# Patient Record
Sex: FEMALE | Race: WHITE | Employment: FULL TIME | URBAN - METROPOLITAN AREA
[De-identification: names, ages, dates, MRNs, and addresses within clinical notes are randomized per-mention and may not be internally consistent; named-entity substitution may affect disease eponyms.]

---

## 2017-01-01 ENCOUNTER — TRANSFERRED RECORDS (OUTPATIENT)
Dept: HEALTH INFORMATION MANAGEMENT | Facility: CLINIC | Age: 62
End: 2017-01-01

## 2017-01-01 LAB — PAP SMEAR - HIM PATIENT REPORTED: NEGATIVE

## 2017-09-15 ENCOUNTER — OFFICE VISIT (OUTPATIENT)
Dept: FAMILY MEDICINE | Facility: CLINIC | Age: 62
End: 2017-09-15
Payer: COMMERCIAL

## 2017-09-15 VITALS
HEART RATE: 68 BPM | SYSTOLIC BLOOD PRESSURE: 132 MMHG | OXYGEN SATURATION: 99 % | TEMPERATURE: 97.5 F | BODY MASS INDEX: 24.63 KG/M2 | DIASTOLIC BLOOD PRESSURE: 82 MMHG | WEIGHT: 148 LBS

## 2017-09-15 DIAGNOSIS — Z01.818 PREOP GENERAL PHYSICAL EXAM: Primary | ICD-10-CM

## 2017-09-15 DIAGNOSIS — H26.9 CATARACT OF RIGHT EYE, UNSPECIFIED CATARACT TYPE: ICD-10-CM

## 2017-09-15 PROCEDURE — 99214 OFFICE O/P EST MOD 30 MIN: CPT | Performed by: PHYSICIAN ASSISTANT

## 2017-09-15 NOTE — MR AVS SNAPSHOT
After Visit Summary   9/15/2017    Yanelis Wolff    MRN: [id number]           Patient Information     Date Of Birth          1955        Visit Information        Provider Department      9/15/2017 3:40 PM Mely Golden PA-C [facility]        Today's Diagnoses     Preop general physical exam    -  1    Cataract of right eye, unspecified cataract type          Care Instructions      Before Your Surgery      Call your surgeon if there is any change in your health. This includes signs of a cold or flu (such as a sore throat, runny nose, cough, rash or fever).    Do not smoke, drink alcohol or take over the counter medicine (unless your surgeon or primary care doctor tells you to) for the 24 hours before and after surgery.    If you take prescribed drugs: Follow your doctor s orders about which medicines to take and which to stop until after surgery.    Eating and drinking prior to surgery: follow the instructions from your surgeon    Take a shower or bath the night before surgery. Use the soap your surgeon gave you to gently clean your skin. If you do not have soap from your surgeon, use your regular soap. Do not shave or scrub the surgery site.  Wear clean pajamas and have clean sheets on your bed.           Follow-ups after your visit        Who to contact     If you have questions or need follow up information about today's clinic visit or your schedule please contact [facility] directly at [phone].  Normal or non-critical lab and imaging results will be communicated to you by MyChart, letter or phone within 4 business days after the clinic has received the results. If you do not hear from us within 7 days, please contact the clinic through KeriCurehart or phone. If you have a critical or abnormal lab result, we will notify you by phone as soon as possible.  Submit refill requests through Monitoring [facility] or call your pharmacy and they will forward the refill  "request to us. Please allow 3 business days for your refill to be completed.          Additional Information About Your Visit        DiagnoplexharGT Urological Information     Cityvox lets you send messages to your doctor, view your test results, renew your prescriptions, schedule appointments and more. To sign up, go to [email or web address] . Click on \"Log in\" on the left side of the screen, which will take you to the Welcome page. Then click on \"Sign up Now\" on the right side of the page.     You will be asked to enter the access code listed below, as well as some personal information. Please follow the directions to create your username and password.     Your access code is: [email or web address]  Expires: 2017  3:49 PM     Your access code will  in 90 days. If you need help or a new code, please call your [place] clinic or [phone].        Care EveryWhere ID     This is your [facility] EveryWhere ID. This could be used by other organizations to access your [place] medical records  [id number]        Your Vitals Were     Pulse Temperature Pulse Oximetry BMI (Body Mass Index)          68 97.5  F (36.4  C) (Oral) 99% 24.63 kg/m2         Blood Pressure from Last 3 Encounters:   09/15/17 132/82   13 156/71   09/08/10 110/62    Weight from Last 3 Encounters:   09/15/17 148 lb (67.1 kg)   13 136 lb (61.7 kg)   09/08/10 151 lb (68.5 kg)              Today, you had the following     No orders found for display         Today's Medication Changes          These changes are accurate as of: 9/15/17  3:49 PM.  If you have any questions, ask your nurse or doctor.               Stop taking these medicines if you haven't already. Please contact your care team if you have questions.     acetaminophen 500 MG tablet   Commonly known as:  TYLENOL   Stopped by:  Mely Golden PA-C           ADVIL PO   Stopped by:  Mely Golden PA-C           indomethacin 50 MG capsule   Commonly known as:  INDOCIN   Stopped by:  " Mely Golden PA-C                    Primary Care Provider Office Phone # Fax #    Christy Homa Boucher [phone]       [address]  [place] [address]        Equal Access to Services     PEPEGREGG CODY : Hadii aad ku hadasho Soomaali, waaxda luqadaha, qaybta kaalmada adeegyada, waxay idiin hayaan adeeg khjamisonsh lalaquitan . So [facility] [phone].    ATENCIÓN: Si habla español, tiene a luis disposición servicios gratuitos de asistencia lingüística. Llame al [phone].    We comply with applicable federal civil rights laws and [place] laws. We do not discriminate on the basis of race, color, national origin, age, disability sex, sexual orientation or gender identity.            Thank you!     Thank you for choosing [facility]  [facility] your [facility]. Our goal is always to provide you with excellent care. Hearing back from our patients is one way we can continue to improve our services. Please take a few minutes to complete the written survey that you may receive in the mail after your visit with us. Thank you!             Your Updated Medication List - Protect others around you: Learn how to safely use, store and throw away your medicines at www.disposemymeds.org.      Notice  As of 9/15/2017  3:49 PM    You have not been prescribed any medications.

## 2017-09-15 NOTE — NURSING NOTE
"Chief Complaint   Patient presents with     Pre-Op Exam       Initial /87  Pulse 68  Temp 97.5  F (36.4  C) (Oral)  Wt 148 lb (67.1 kg)  SpO2 99%  BMI 24.63 kg/m2 Estimated body mass index is 24.63 kg/(m^2) as calculated from the following:    Height as of 7/6/13: 5' 5\" (1.651 m).    Weight as of this encounter: 148 lb (67.1 kg).  Medication Reconciliation: complete     Kayla Arthur, ya      "

## 2017-09-15 NOTE — PROGRESS NOTES
[facility]  [address] [address]  [phone]  Dept: [phone]    PRE-OP EVALUATION:  Today's date: 9/15/2017    Yanelis Wolff (: 1955) presents for pre-operative evaluation assessment as requested by Dr. Matos.  She requires evaluation and anesthesia risk assessment prior to undergoing surgery/procedure for treatment of right cataract.  Proposed procedure: cataract repair    Date of Surgery/ Procedure: 17  Time of Surgery/ Procedure: to be determined  Hospital/Surgical Facility: [facility]    Primary Physician: Christy Boucher  Type of Anesthesia Anticipated: to be determined    Patient has a Health Care Directive or Living Will:  NO    Preop Questions 9/15/2017   1.  Do you have a history of heart attack, stroke, stent, bypass or surgery on an artery in the head, neck, heart or legs? No   2.  Do you ever have any pain or discomfort in your chest? No   3.  Do you have a history of  Heart Failure? No   4.   Are you troubled by shortness of breath when:  walking on a level surface, or up a slight hill, or at night? No   5.  Do you currently have a cold, bronchitis or other respiratory infection? No   6.  Do you have a cough, shortness of breath, or wheezing? No    7.  Do you sometimes get pains in the calves of your legs when you walk? No   8. Do you or anyone in your family have previous history of blood clots? No   9.  Do you or does anyone in your family have a serious bleeding problem such as prolonged bleeding following surgeries or cuts? No   10. Have you ever had problems with anemia or been told to take iron pills? No   11. Have you had any abnormal blood loss such as black, tarry or bloody stools, or abnormal vaginal bleeding? No   12. Have you ever had a blood transfusion? No   13. Have you or any of your relatives ever had problems with anesthesia? No   14. Do you have sleep apnea, excessive snoring or daytime drowsiness? No   15. Do  you have any prosthetic heart valves? No   16. Do you have prosthetic joints? No   17. Is there any chance that you may be pregnant? No           HPI:                                                      Brief HPI related to upcoming procedure: problems with right eye vision for the past few months. She is having cataract repair of the right eye only.    Patient continues to decline all health maintenance.     See problem list for active medical problems.  Problems all longstanding and stable, except as noted/documented.  See ROS for pertinent symptoms related to these conditions.                                                                                                  .    MEDICAL HISTORY:                                                    Patient Active Problem List    Diagnosis Date Noted     CARDIOVASCULAR SCREENING; LDL GOAL LESS THAN 160 10/31/2010     Priority: Medium     Microscopic hematuria      Priority: Medium     Edema 09/01/2010     Priority: Medium     Leg pain 09/01/2010     Priority: Medium      Past Medical History:   Diagnosis Date     Edema      Microscopic hematuria      MVP (mitral valve prolapse)     negative stress test and echo in 2009     Smoker     quit in 2009     History reviewed. No pertinent surgical history.  No current outpatient prescriptions on file.     OTC products: None, except as noted above    Allergies   Allergen Reactions     Codeine       Latex Allergy: NO    Social History   Substance Use Topics     Smoking status: Former Smoker     Quit date: 9/1/2009     Smokeless tobacco: Never Used     Alcohol use Yes     History   Drug Use No       REVIEW OF SYSTEMS:                                                    C: NEGATIVE for fever, chills, change in weight  INTEGUMENTARY/SKIN: NEGATIVE for worrisome rashes, moles or lesions  EYES: POSITIVE for right eye - decreased vision and NEGATIVE for left eye vision changes, discharge and eye pain   E/M: NEGATIVE for ear, mouth and  throat problems  R: NEGATIVE for significant cough or SOB  CV: NEGATIVE for chest pain, palpitations or peripheral edema  GI: NEGATIVE for nausea, abdominal pain, heartburn, or change in bowel habits    EXAM:                                                    /82  Pulse 68  Temp 97.5  F (36.4  C) (Oral)  Wt 148 lb (67.1 kg)  SpO2 99%  BMI 24.63 kg/m2  GENERAL APPEARANCE: healthy, alert and no distress  HENT: ear canals and TM's normal and nose and mouth without ulcers or lesions  RESP: lungs clear to auscultation - no rales, rhonchi or wheezes  CV: regular rate and rhythm, normal S1 S2, no S3 or S4 and no murmur, click or rub   ABDOMEN: soft, nontender, no HSM or masses and bowel sounds normal  NEURO: Normal strength and tone, sensory exam grossly normal, mentation intact and speech normal    DIAGNOSTICS:                                                    No labs or EKG required for low risk surgery (cataract, skin procedure, breast biopsy, etc)    Recent Labs   Lab Test  09/08/10   1621  09/01/10   1609   NA  141  142   POTASSIUM  4.4  4.4   CR  0.84  0.87        IMPRESSION:                                                    Reason for surgery/procedure: Right eye cataract repair  Diagnosis/reason for consult: clearance for anesthesia     The proposed surgical procedure is considered LOW risk.    REVISED CARDIAC RISK INDEX  The patient has the following serious cardiovascular risks for perioperative complications such as (MI, PE, VFib and 3  AV Block):  No serious cardiac risks  INTERPRETATION: 0 risks: Class I (very low risk - 0.4% complication rate)    The patient has the following additional risks for perioperative complications:  No identified additional risks      ICD-10-CM    1. Preop general physical exam Z01.818    2. Cataract of right eye, unspecified cataract type H26.9        RECOMMENDATIONS:                                                      APPROVAL GIVEN to proceed with proposed  procedure, without further diagnostic evaluation       Signed Electronically by: Mely Golden PA-C  Chart reviewed, agree with evaluation and recommendations above.  Mary Hunt M.D.       Copy of this evaluation report is provided to requesting physician.    [place] Preop Guidelines

## 2017-10-05 ENCOUNTER — TELEPHONE (OUTPATIENT)
Dept: FAMILY MEDICINE | Facility: CLINIC | Age: 62
End: 2017-10-05

## 2017-10-08 ENCOUNTER — HEALTH MAINTENANCE LETTER (OUTPATIENT)
Age: 62
End: 2017-10-08

## 2017-11-17 ENCOUNTER — TELEPHONE (OUTPATIENT)
Dept: FAMILY MEDICINE | Facility: CLINIC | Age: 62
End: 2017-11-17

## 2017-11-17 NOTE — TELEPHONE ENCOUNTER
Panel Management Review          Composite cancer screening  Chart review shows that this patient is due/due soon for the following Pap Smear  Summary:    Patient is due/failing the following:   PAP    Action needed:   Patient needs office visit for pap smear.    Type of outreach:    Sent letter.    Questions for provider review:    None                                                                                                                                    Lali Holcomb CMA       Chart routed to close .

## 2017-11-17 NOTE — LETTER
[facility]  [address] [facility] [address]  Phone: [phone]    11/17/17    Yanelis Wolff  [address] [facility] [address]      To whom it may concern:     Our records indicate that you are due for a annual female exam with pap smear.   Monitoring and managing your preventative and chronic health conditions are very important to us.     If you have received your health care elsewhere, please provide us with that information so it can be documented in your chart.    Please call [phone] or message us through your [facility] account to schedule an appointment or provide information for your chart.     I look forward to seeing you and working with you on your health care needs.         Sincerely,       BELINDA Escalera   on behalf of   Mary Hunt MD

## 2018-02-09 ENCOUNTER — TELEPHONE (OUTPATIENT)
Dept: FAMILY MEDICINE | Facility: CLINIC | Age: 63
End: 2018-02-09

## 2018-02-12 NOTE — TELEPHONE ENCOUNTER
Repeat mammo pt from 10/2017. Pt declined and will call to schedule this herself. Did not place call. NX

## 2018-04-24 ENCOUNTER — RADIANT APPOINTMENT (OUTPATIENT)
Dept: GENERAL RADIOLOGY | Facility: CLINIC | Age: 63
End: 2018-04-24
Attending: PHYSICIAN ASSISTANT
Payer: COMMERCIAL

## 2018-04-24 ENCOUNTER — OFFICE VISIT (OUTPATIENT)
Dept: URGENT CARE | Facility: URGENT CARE | Age: 63
End: 2018-04-24
Payer: COMMERCIAL

## 2018-04-24 VITALS
RESPIRATION RATE: 20 BRPM | WEIGHT: 158.4 LBS | BODY MASS INDEX: 26.36 KG/M2 | SYSTOLIC BLOOD PRESSURE: 144 MMHG | TEMPERATURE: 98.6 F | DIASTOLIC BLOOD PRESSURE: 72 MMHG | HEART RATE: 82 BPM | OXYGEN SATURATION: 96 %

## 2018-04-24 DIAGNOSIS — R31.29 MICROSCOPIC HEMATURIA: ICD-10-CM

## 2018-04-24 DIAGNOSIS — M54.50 ACUTE BILATERAL LOW BACK PAIN WITHOUT SCIATICA: ICD-10-CM

## 2018-04-24 DIAGNOSIS — J22 LOWER RESPIRATORY TRACT INFECTION: Primary | ICD-10-CM

## 2018-04-24 LAB
ALBUMIN UR-MCNC: NEGATIVE MG/DL
AMORPH CRY #/AREA URNS HPF: ABNORMAL /HPF
APPEARANCE UR: CLEAR
BILIRUB UR QL STRIP: NEGATIVE
COLOR UR AUTO: YELLOW
GLUCOSE UR STRIP-MCNC: NEGATIVE MG/DL
HGB UR QL STRIP: ABNORMAL
KETONES UR STRIP-MCNC: NEGATIVE MG/DL
LEUKOCYTE ESTERASE UR QL STRIP: NEGATIVE
NITRATE UR QL: NEGATIVE
NON-SQ EPI CELLS #/AREA URNS LPF: ABNORMAL /LPF
PH UR STRIP: 6.5 PH (ref 5–7)
RBC #/AREA URNS AUTO: ABNORMAL /HPF
SOURCE: ABNORMAL
SP GR UR STRIP: <=1.005 (ref 1–1.03)
UROBILINOGEN UR STRIP-ACNC: 0.2 EU/DL (ref 0.2–1)
WBC #/AREA URNS AUTO: ABNORMAL /HPF

## 2018-04-24 PROCEDURE — 99214 OFFICE O/P EST MOD 30 MIN: CPT | Performed by: PHYSICIAN ASSISTANT

## 2018-04-24 PROCEDURE — 71046 X-RAY EXAM CHEST 2 VIEWS: CPT | Mod: FY

## 2018-04-24 PROCEDURE — 93000 ELECTROCARDIOGRAM COMPLETE: CPT | Performed by: PHYSICIAN ASSISTANT

## 2018-04-24 PROCEDURE — 81001 URINALYSIS AUTO W/SCOPE: CPT | Performed by: PHYSICIAN ASSISTANT

## 2018-04-24 PROCEDURE — 87086 URINE CULTURE/COLONY COUNT: CPT | Performed by: PHYSICIAN ASSISTANT

## 2018-04-24 RX ORDER — PREDNISONE 20 MG/1
20 TABLET ORAL 2 TIMES DAILY
Qty: 10 TABLET | Refills: 0 | Status: SHIPPED | OUTPATIENT
Start: 2018-04-24 | End: 2019-01-14

## 2018-04-24 RX ORDER — AZITHROMYCIN 250 MG/1
TABLET, FILM COATED ORAL
Qty: 6 TABLET | Refills: 0 | Status: SHIPPED | OUTPATIENT
Start: 2018-04-24 | End: 2018-09-04

## 2018-04-24 RX ORDER — ALBUTEROL SULFATE 90 UG/1
2 AEROSOL, METERED RESPIRATORY (INHALATION) EVERY 4 HOURS PRN
Qty: 1 INHALER | Refills: 0 | Status: SHIPPED | OUTPATIENT
Start: 2018-04-24 | End: 2018-09-04

## 2018-04-24 RX ORDER — BENZONATATE 200 MG/1
200 CAPSULE ORAL 3 TIMES DAILY PRN
Qty: 30 CAPSULE | Refills: 0 | Status: SHIPPED | OUTPATIENT
Start: 2018-04-24 | End: 2018-05-04

## 2018-04-24 ASSESSMENT — ENCOUNTER SYMPTOMS
FEVER: 0
NERVOUS/ANXIOUS: 1
WEIGHT LOSS: 0
WHEEZING: 0
CARDIOVASCULAR NEGATIVE: 1
DIAPHORESIS: 0
NEUROLOGICAL NEGATIVE: 1
SPUTUM PRODUCTION: 1
CONSTITUTIONAL NEGATIVE: 1
GASTROINTESTINAL NEGATIVE: 1
HEMOPTYSIS: 0
SHORTNESS OF BREATH: 1
EYE PAIN: 0
FREQUENCY: 1
COUGH: 1
PALPITATIONS: 0

## 2018-04-24 NOTE — MR AVS SNAPSHOT
"              After Visit Summary   2018    Yanelis Wolff    MRN: [id number]           Patient Information     Date Of Birth          1955        Visit Information        Provider Department      2018 5:00 PM Liseth Beal PA-C [facility]        Today's Diagnoses     Lower respiratory tract infection    -  1    Acute bilateral low back pain without sciatica           Follow-ups after your visit        Who to contact     If you have questions or need follow up information about today's clinic visit or your schedule please contact [facility] directly at [phone].  Normal or non-critical lab and imaging results will be communicated to you by Coupzhart, letter or phone within 4 business days after the clinic has received the results. If you do not hear from us within 7 days, please contact the clinic through Coupzhart or phone. If you have a critical or abnormal lab result, we will notify you by phone as soon as possible.  Submit refill requests through BOOK A TIGER or call your pharmacy and they will forward the refill request to us. Please allow 3 business days for your refill to be completed.          Additional Information About Your Visit        MyChart Information     BOOK A TIGER lets you send messages to your doctor, view your test results, renew your prescriptions, schedule appointments and more. To sign up, go to [email or web address] . Click on \"Log in\" on the left side of the screen, which will take you to the Welcome page. Then click on \"Sign up Now\" on the right side of the page.     You will be asked to enter the access code listed below, as well as some personal information. Please follow the directions to create your username and password.     Your access code is: [email or web address]  Expires: 2018  9:16 AM     Your access code will  in 90 days. If you need help or a new code, please call your [facility] or [phone].        Care EveryWhere ID     " This is your Care EveryWhere ID. This could be used by other organizations to access your [place] medical records  [id number]        Your Vitals Were     Pulse Temperature Respirations Pulse Oximetry BMI (Body Mass Index)       82 98.6  F (37  C) (Tympanic) 20 96% 26.36 kg/m2        Blood Pressure from Last 3 Encounters:   04/24/18 171/78   09/15/17 132/82   07/06/13 156/71    Weight from Last 3 Encounters:   04/24/18 158 lb 6.4 oz (71.8 kg)   09/15/17 148 lb (67.1 kg)   07/06/13 136 lb (61.7 kg)              We Performed the Following     EKG 12-lead complete w/read - Clinics     UA with Microscopic reflex to Culture     Urine Culture Aerobic Bacterial     XR Chest 2 Views          Today's Medication Changes          These changes are accurate as of 4/24/18  6:10 PM.  If you have any questions, ask your nurse or doctor.               Start taking these medicines.        Dose/Directions    albuterol 108 (90 Base) MCG/ACT Inhaler   Commonly known as:  PROAIR HFA/PROVENTIL HFA/VENTOLIN HFA   Used for:  Lower respiratory tract infection   Started by:  Liseth Beal PA-C        Dose:  2 puff   Inhale 2 puffs into the lungs every 4 hours as needed for shortness of breath / dyspnea or wheezing   Quantity:  1 Inhaler   Refills:  0       azithromycin 250 MG tablet   Commonly known as:  ZITHROMAX   Used for:  Lower respiratory tract infection   Started by:  Liseth Beal PA-C        2 tablets the first day, then 1 tablet daily for the next 4 days   Quantity:  6 tablet   Refills:  0       benzonatate 200 MG capsule   Commonly known as:  TESSALON   Used for:  Lower respiratory tract infection   Started by:  Liseth Beal PA-C        Dose:  200 mg   Take 1 capsule (200 mg) by mouth 3 times daily as needed for cough   Quantity:  30 capsule   Refills:  0       predniSONE 20 MG tablet   Commonly known as:  DELTASONE   Used for:  Lower respiratory tract infection   Started by:  Liseth Beal PA-C        Dose:  20 mg   Take 1  tablet (20 mg) by mouth 2 times daily for 5 days   Quantity:  10 tablet   Refills:  0            Where to get your medicines      These medications were sent to [id number] - [place], [place] - [address]  [address] [place] [address]    Hours:  test fax sent successfully 7/31/03  Phone:  [phone]     albuterol 108 (90 Base) MCG/ACT Inhaler    azithromycin 250 MG tablet    benzonatate 200 MG capsule    predniSONE 20 MG tablet                Primary Care Provider Office Phone # Fax #    Christy Boucher [phone]       [address]  [place] [address]        Equal Access to Services     [facility] Center[facility] : Hadii adan munguia hadasho Socheryl, waaxda luqadaha, qaybta kaalmada adeegyada, sukhjinder barrera . So [facility] [phone].    ATENCIÓN: Si habla español, tiene a luis disposición servicios gratuitos de asistencia lingüística. [facility] [phone].    We comply with applicable federal civil rights laws and [place] laws. We do not discriminate on the basis of race, color, national origin, age, disability, sex, sexual orientation, or gender identity.            Thank you!     Thank you for choosing [facility]  [facility] your [facility]. Our goal is always to provide you with excellent care. Hearing back from our patients is one way we can continue to improve our services. Please take a few minutes to complete the written survey that you may receive in the mail after your visit with us. Thank you!             Your Updated Medication List - Protect others around you: Learn how to safely use, store and throw away your medicines at www.disposemymeds.org.          This list is accurate as of 4/24/18  6:10 PM.  Always use your most recent med list.                   Brand Name Dispense Instructions for use Diagnosis    albuterol 108 (90 Base) MCG/ACT Inhaler    PROAIR HFA/PROVENTIL HFA/VENTOLIN HFA    1 Inhaler    Inhale 2 puffs into the  lungs every 4 hours as needed for shortness of breath / dyspnea or wheezing    Lower respiratory tract infection       azithromycin 250 MG tablet    ZITHROMAX    6 tablet    2 tablets the first day, then 1 tablet daily for the next 4 days    Lower respiratory tract infection       benzonatate 200 MG capsule    TESSALON    30 capsule    Take 1 capsule (200 mg) by mouth 3 times daily as needed for cough    Lower respiratory tract infection       predniSONE 20 MG tablet    DELTASONE    10 tablet    Take 1 tablet (20 mg) by mouth 2 times daily for 5 days    Lower respiratory tract infection

## 2018-04-24 NOTE — LETTER
April 26, 2018      Yanelis Wolff  [address]   [place] [address]        Dear ,    We are writing to inform you of your test results.    Urine culture revealed normal urogenital familia present.  No antibiotics needed.  F/u with PCP regarding the blood in her urine.  Recheck in clinic if symptoms worsen or if symptoms do not improve.    Resulted Orders   UA with Microscopic reflex to Culture   Result Value Ref Range    Color Urine Yellow     Appearance Urine Clear     Glucose Urine Negative NEG^Negative mg/dL    Bilirubin Urine Negative NEG^Negative    Ketones Urine Negative NEG^Negative mg/dL    Specific Gravity Urine <=1.005 1.003 - 1.035    pH Urine 6.5 5.0 - 7.0 pH    Protein Albumin Urine Negative NEG^Negative mg/dL    Urobilinogen Urine 0.2 0.2 - 1.0 EU/dL    Nitrite Urine Negative NEG^Negative    Blood Urine Small (A) NEG^Negative    Leukocyte Esterase Urine Negative NEG^Negative    Source Midstream Urine     WBC Urine 0 - 5 OTO5^0 - 5 /HPF    RBC Urine O - 2 OTO2^O - 2 /HPF    Squamous Epithelial /LPF Urine Few FEW^Few /LPF    Amorphous Crystals Few (A) NEG^Negative /HPF   Urine Culture Aerobic Bacterial   Result Value Ref Range    Specimen Description Midstream Urine     Culture Micro       <10,000 colonies/mL  urogenital familia  Susceptibility testing not routinely done         If you have any questions or concerns, please call the clinic at the number listed above.       Sincerely,        Liseth Beal PA-C

## 2018-04-24 NOTE — PROGRESS NOTES
SUBJECTIVE:                                                       HPI  Yanelis Wolff is a 63 year old female who presents to clinic today for the following health issues:  RESPIRATORY SYMPTOMS    Duration: 2 months, been getting worse    Description  Productive cough along with chest congestion, shortness of breath, and chest tightness.  No hemoptysis or wheezing.    Severity: moderate    Accompanying signs and symptoms:  Also reports bilateral LBP along with discoloration of the urine [id number].  Some urinary frequency but no dysuria, urgency or hematuria.  No radicular pain, numbness, tingling or weakness.  No bladder or bowel dysfunction.  No trauma or injuries.  No palpitations, orthopnea, PND or peripheral edema.  No HA, visual disturbances, dizziness, one sided weakness or slurred speech.  No abdominal pain, n/v, constipation, diarrhea, bloody or black tarry stools.  No fever, chills or sweats.       History (predisposing factors):  Former cigarette smoker.  No ill contacts.  No pmh of asthma.     Precipitating or alleviating factors: worse at night when lying on her back.    Therapies tried and outcome:  Rest and fluids with minimal relief      Reviewed PMH.  Patient Active Problem List   Diagnosis     Edema     Leg pain     Microscopic hematuria     CARDIOVASCULAR SCREENING; LDL GOAL LESS THAN 160     No current outpatient prescriptions on file.     Allergies   Allergen Reactions     Codeine        Review of Systems   Constitutional: Negative.  Negative for diaphoresis, fever and weight loss.   HENT: Negative.  Negative for congestion.    Eyes: Negative for pain.   Respiratory: Positive for cough, sputum production and shortness of breath. Negative for hemoptysis and wheezing.    Cardiovascular: Negative.  Negative for chest pain and palpitations.   Gastrointestinal: Negative.    Genitourinary: Positive for frequency.   Skin: Negative.    Neurological: Negative.    Psychiatric/Behavioral: The patient  is nervous/anxious.    All other systems reviewed and are negative.      /72  Pulse 82  Temp 98.6  F (37  C) (Tympanic)  Resp 20  Wt 158 lb 6.4 oz (71.8 kg)  SpO2 96%  BMI 26.36 kg/m2  Physical Exam   Constitutional: She is oriented to person, place, and time and well-developed, well-nourished, and in no distress. No distress.   HENT:   Head: Normocephalic and atraumatic.   Nose: Nose normal.   Mouth/Throat: Oropharynx is clear and moist. No oropharyngeal exudate.   TMs are intact without any erythema or bulging bilaterally.  Airway is patent.   Eyes: Conjunctivae and EOM are normal. Pupils are equal, round, and reactive to light. No scleral icterus.   Neck: Normal range of motion. Neck supple. No thyromegaly present.   Cardiovascular: Normal rate, regular rhythm, normal heart sounds and intact distal pulses.  Exam reveals no gallop and no friction rub.    No murmur heard.  Pulmonary/Chest: Effort normal and breath sounds normal. No accessory muscle usage. No respiratory distress. She has no decreased breath sounds. She has no wheezes. She has no rhonchi. She has no rales.   Abdominal: Soft. Normal appearance, normal aorta and bowel sounds are normal. She exhibits no mass. There is no hepatosplenomegaly. There is no tenderness. There is no rebound, no guarding, no CVA tenderness, no tenderness at McBurney's point and negative Romero's sign. No hernia.   Musculoskeletal:        Lumbar back: She exhibits tenderness and spasm (paraspinous muscle ). She exhibits normal range of motion, no bony tenderness, no swelling, no edema, no deformity, no laceration and normal pulse.   Lymphadenopathy:     She has no cervical adenopathy.   Neurological: She is alert and oriented to person, place, and time. She has normal motor skills, normal sensation, normal strength and normal reflexes. She has a normal Straight Leg Raise Test. Gait normal. Gait normal.   Skin: Skin is warm, dry and intact. No cyanosis. Nails show no  clubbing.   Distal pulses are 2+ and symmetric.  No peripheral edema.   Psychiatric: Mood, memory, affect and judgment normal.   Nursing note and vitals reviewed.  EKG:  NSR, normal axis, 77bpm.  No ST-T wave abnormalities.  No acute changes.  No old EKGs for comparison.  Personally reviewed.  CXR PA/lateral:  No infiltrates, effusions or pneumothorax.  No suspicious nodules or lesions. No fractures.   Will send for overread.        Assessment/Plan:  Lower respiratory tract infection:  CXR was negative for pneumonia.  EKG was also normal as well.  Will treat with zithromax X5days, prednisone X5days, tessalon perles, and albuterol inh as needed for symptoms.  Recommend treatment with rest, fluids and chicken soup. Tylenol/ibuprofen prn fever/pain.  Recheck in clinic if symptoms worsen or if symptoms do not improve.  To the ER if she develops hemoptysis, chest pain, fevers>102, worsening shortness of breath/wheezing.    -     EKG 12-lead complete w/read - Clinics  -     XR Chest 2 Views  -     azithromycin (ZITHROMAX) 250 MG tablet; 2 tablets the first day, then 1 tablet daily for the next 4 days  -     predniSONE (DELTASONE) 20 MG tablet; Take 1 tablet (20 mg) by mouth 2 times daily for 5 days  -     albuterol (PROAIR HFA/PROVENTIL HFA/VENTOLIN HFA) 108 (90 Base) MCG/ACT Inhaler; Inhale 2 puffs into the lungs every 4 hours as needed for shortness of breath / dyspnea or wheezing  -     benzonatate (TESSALON) 200 MG capsule; Take 1 capsule (200 mg) by mouth 3 times daily as needed for cough    Acute bilateral low back pain without sciatica:  UA showed small blood and amorphous crystals present.  Will send for urine culture.  No flank pain or systemic symptoms.  Most likely musculoskeletal.  F/u with PCP regarding microscopic hematuria present.  -     UA with Microscopic reflex to Culture  -     Urine Culture Aerobic Bacterial    Microscopic hematuria          Liseth Beal PA-C

## 2018-04-25 LAB
BACTERIA SPEC CULT: NORMAL
SPECIMEN SOURCE: NORMAL

## 2018-09-04 ENCOUNTER — OFFICE VISIT (OUTPATIENT)
Dept: URGENT CARE | Facility: URGENT CARE | Age: 63
End: 2018-09-04
Payer: COMMERCIAL

## 2018-09-04 VITALS
WEIGHT: 160 LBS | HEART RATE: 91 BPM | DIASTOLIC BLOOD PRESSURE: 89 MMHG | OXYGEN SATURATION: 95 % | BODY MASS INDEX: 26.63 KG/M2 | TEMPERATURE: 98.7 F | SYSTOLIC BLOOD PRESSURE: 157 MMHG

## 2018-09-04 DIAGNOSIS — R03.0 ELEVATED BLOOD PRESSURE READING WITHOUT DIAGNOSIS OF HYPERTENSION: ICD-10-CM

## 2018-09-04 DIAGNOSIS — J98.8 WHEEZING-ASSOCIATED RESPIRATORY INFECTION (WARI): Primary | ICD-10-CM

## 2018-09-04 PROCEDURE — 99213 OFFICE O/P EST LOW 20 MIN: CPT | Performed by: PHYSICIAN ASSISTANT

## 2018-09-04 RX ORDER — ALBUTEROL SULFATE 90 UG/1
2 AEROSOL, METERED RESPIRATORY (INHALATION) EVERY 6 HOURS PRN
Qty: 1 INHALER | Refills: 1 | Status: SHIPPED | OUTPATIENT
Start: 2018-09-04 | End: 2019-05-06

## 2018-09-04 RX ORDER — AMOXICILLIN 875 MG
875 TABLET ORAL 2 TIMES DAILY
Qty: 20 TABLET | Refills: 0 | Status: SHIPPED | OUTPATIENT
Start: 2018-09-04 | End: 2018-11-12

## 2018-09-04 NOTE — PROGRESS NOTES
S: 64 yo female here for evaluation of cough/wheezing since mid July. No sneezing or watery/itchy eyes. No fever. Similar symptoms in 4/24/2018. Seen here. Given z pack and albuterol. Patient specifically requests Amoxicillin and albuterol inhaler. No fever or night sweats.    Had CXR in April 2018- read as normal.    Allergies   Allergen Reactions     Codeine        Past Medical History:   Diagnosis Date     Edema      Microscopic hematuria      MVP (mitral valve prolapse)     negative stress test and echo in 2009     Smoker     quit in 2009         No current outpatient prescriptions on file prior to visit.  No current facility-administered medications on file prior to visit.     Social History   Substance Use Topics     Smoking status: Former Smoker     Quit date: 9/1/2009     Smokeless tobacco: Never Used     Alcohol use Yes       ROS:  CONSTITUTIONAL: Negative for fatigue or fever.  EYES: Negative for eye problems.  ENT: As above.  RESP: As above.  CV: Negative for chest pains.  GI: Negative for vomiting.  MUSCULOSKELETAL:  Negative for significant muscle or joint pains.  NEUROLOGIC: Negative for headaches.  SKIN: Negative for rash.    OBJECTIVE:  /89  Pulse 91  Temp 98.7  F (37.1  C) (Tympanic)  Wt 160 lb (72.6 kg)  SpO2 95%  BMI 26.63 kg/m2  GENERAL APPEARANCE: Healthy, alert and no distress.  EYES:Conjunctiva/sclera clear.  EARS: No cerumen.   Ear canals w/o erythema.  TM's intact w/o erythema.    NOSE/MOUTH: Nose without ulcers, erythema or lesions.  SINUSES: No maxillary sinus tenderness.  THROAT: No erythema w/o tonsillar enlargement . No exudates.  NECK: Supple, nontender, no lymphadenopathy.  RESP: Lungs with some exp wheezes throughout.  CV: Regular rate and rhythm, normal S1 S2, no murmur noted.  NEURO: Awake, alert    SKIN: No rashes      ASSESSMENT:     ICD-10-CM    1. Wheezing-associated respiratory infection (WARI) J98.8 albuterol (PROAIR HFA/PROVENTIL HFA/VENTOLIN HFA) 108 (90 Base)  MCG/ACT inhaler     amoxicillin (AMOXIL) 875 MG tablet   2. Elevated blood pressure reading without diagnosis of hypertension R03.0        PLAN:Recheck BP once through this illness.  Lots of rest and fluids. Needs to obtain PCP.  RTC if any worsening symptoms or if not improving.    Gogo Benjamin PA-C

## 2018-09-04 NOTE — MR AVS SNAPSHOT
"              After Visit Summary   2018    Yanelis Wolff    MRN: [id number]           Patient Information     Date Of Birth          1955        Visit Information        Provider Department      2018 5:00 PM Gogo Benjamin PA-C [facility]        Today's Diagnoses     Wheezing-associated respiratory infection (WARI)    -  1    Elevated blood pressure reading without diagnosis of hypertension           Follow-ups after your visit        Who to contact     If you have questions or need follow up information about today's clinic visit or your schedule please contact [facility] directly at [phone].  Normal or non-critical lab and imaging results will be communicated to you by [employer], letter or phone within 4 business days after the clinic has received the results. If you do not hear from us within 7 days, please contact the clinic through [employer] or phone. If you have a critical or abnormal lab result, we will notify you by phone as soon as possible.  Submit refill requests through [employer] or call your pharmacy and they will forward the refill request to us. Please allow 3 business days for your refill to be completed.          Additional Information About Your Visit        MyChart Information     [employer] lets you send messages to your doctor, view your test results, renew your prescriptions, schedule appointments and more. To sign up, go to [email or web address] . Click on \"Log in\" on the left side of the screen, which will take you to the Welcome page. Then click on \"Sign up Now\" on the right side of the page.     You will be asked to enter the access code listed below, as well as some personal information. Please follow the directions to create your username and password.     Your access code is: [email or web address]  Expires: 12/3/2018  5:21 PM     Your access code will  in 90 days. If you need help or a new code, please call your [facility] or " [phone].        Care EveryWhere ID     This is your Care EveryWhere ID. This could be used by other organizations to access your [facility] medical records  [id number]        Your Vitals Were     Pulse Temperature Pulse Oximetry BMI (Body Mass Index)          91 98.7  F (37.1  C) (Tympanic) 95% 26.63 kg/m2         Blood Pressure from Last 3 Encounters:   09/04/18 157/89   04/24/18 144/72   09/15/17 132/82    Weight from Last 3 Encounters:   09/04/18 160 lb (72.6 kg)   04/24/18 158 lb 6.4 oz (71.8 kg)   09/15/17 148 lb (67.1 kg)              Today, you had the following     No orders found for display         Today's Medication Changes          These changes are accurate as of 9/4/18  5:21 PM.  If you have any questions, ask your nurse or doctor.               Start taking these medicines.        Dose/Directions    albuterol 108 (90 Base) MCG/ACT inhaler   Commonly known as:  PROAIR HFA/PROVENTIL HFA/VENTOLIN HFA   Used for:  Wheezing-associated respiratory infection (WARI)        Dose:  2 puff   Inhale 2 puffs into the lungs every 6 hours as needed for shortness of breath / dyspnea or wheezing   Quantity:  1 Inhaler   Refills:  1       amoxicillin 875 MG tablet   Commonly known as:  AMOXIL   Used for:  Wheezing-associated respiratory infection (WARI)        Dose:  875 mg   Take 1 tablet (875 mg) by mouth 2 times daily   Quantity:  20 tablet   Refills:  0            Where to get your medicines      These medications were sent to [id number] - [place] - [address]  [address] [address]    Hours:  test fax sent successfully 7/31/03  Phone:  [phone]     albuterol 108 (90 Base) MCG/ACT inhaler    amoxicillin 875 MG tablet                Primary Care Provider Office Phone # Fax #    Christy Boucher [phone] [address] [facility]  [place] [facility] [address]        Equal Access to Services     DORIAN ROSS AH: Tiffanie Sutherland  sylvie sparrow, tramta karufina egan, sukhjinder goldbergaakarli ah. So [facility] [phone].    ATENCIÓN: Si brooks johnson, tiene a luis disposición servicios gratuitos de asistencia lingüística. Juvenal al [phone].    We comply with applicable federal civil rights laws and [place] laws. We do not discriminate on the basis of race, color, national origin, age, disability, sex, sexual orientation, or gender identity.            Thank you!     Thank you for choosing [facility]  [facility] your [facility]. Our goal is always to provide you with excellent care. Hearing back from our patients is one way we can continue to improve our services. Please take a few minutes to complete the written survey that you may receive in the mail after your visit with us. Thank you!             Your Updated Medication List - Protect others around you: Learn how to safely use, store and throw away your medicines at www.disposemymeds.org.          This list is accurate as of 9/4/18  5:21 PM.  Always use your most recent med list.                   Brand Name Dispense Instructions for use Diagnosis    albuterol 108 (90 Base) MCG/ACT inhaler    PROAIR HFA/PROVENTIL HFA/VENTOLIN HFA    1 Inhaler    Inhale 2 puffs into the lungs every 6 hours as needed for shortness of breath / dyspnea or wheezing    Wheezing-associated respiratory infection (WARI)       amoxicillin 875 MG tablet    AMOXIL    20 tablet    Take 1 tablet (875 mg) by mouth 2 times daily    Wheezing-associated respiratory infection (WARI)

## 2018-11-12 ENCOUNTER — OFFICE VISIT (OUTPATIENT)
Dept: FAMILY MEDICINE | Facility: CLINIC | Age: 63
End: 2018-11-12
Payer: COMMERCIAL

## 2018-11-12 VITALS
RESPIRATION RATE: 16 BRPM | BODY MASS INDEX: 26.13 KG/M2 | DIASTOLIC BLOOD PRESSURE: 88 MMHG | WEIGHT: 157 LBS | OXYGEN SATURATION: 96 % | SYSTOLIC BLOOD PRESSURE: 139 MMHG | TEMPERATURE: 97.5 F | HEART RATE: 85 BPM

## 2018-11-12 DIAGNOSIS — J98.8 WHEEZING-ASSOCIATED RESPIRATORY INFECTION: Primary | ICD-10-CM

## 2018-11-12 PROCEDURE — 99214 OFFICE O/P EST MOD 30 MIN: CPT | Performed by: NURSE PRACTITIONER

## 2018-11-12 RX ORDER — PREDNISONE 20 MG/1
20 TABLET ORAL 2 TIMES DAILY
Qty: 14 TABLET | Refills: 0 | Status: SHIPPED | OUTPATIENT
Start: 2018-11-12 | End: 2019-05-06

## 2018-11-12 RX ORDER — AZITHROMYCIN 250 MG/1
TABLET, FILM COATED ORAL
Qty: 6 TABLET | Refills: 0 | Status: SHIPPED | OUTPATIENT
Start: 2018-11-12 | End: 2019-01-14

## 2018-11-12 RX ORDER — ALBUTEROL SULFATE 90 UG/1
2 AEROSOL, METERED RESPIRATORY (INHALATION) EVERY 6 HOURS PRN
Qty: 1 INHALER | Refills: 0 | Status: SHIPPED | OUTPATIENT
Start: 2018-11-12 | End: 2019-01-14

## 2018-11-12 NOTE — PROGRESS NOTES
SUBJECTIVE:   Yanelis Wolff is a 63 year old female presenting with a chief complaint of cough - productive.  Onset of symptoms was 2 month(s) ago.  Course of illness is worsening.    Severity moderate  Current and Associated symptoms: wheezing, shortness of breath  Treatment measures tried include : prednisone, albuterol  Has been on amoxicillin in September didn't help much, xray was normal at that time  She doesn't smoke anymore but used to.       Past Medical History:   Diagnosis Date     Edema      Microscopic hematuria      MVP (mitral valve prolapse)     negative stress test and echo in 2009     Smoker     quit in 2009     Current Outpatient Prescriptions   Medication Sig Dispense Refill     albuterol (PROAIR HFA/PROVENTIL HFA/VENTOLIN HFA) 108 (90 Base) MCG/ACT inhaler Inhale 2 puffs into the lungs every 6 hours as needed for shortness of breath / dyspnea or wheezing 1 Inhaler 1     amoxicillin (AMOXIL) 875 MG tablet Take 1 tablet (875 mg) by mouth 2 times daily 20 tablet 0     Social History   Substance Use Topics     Smoking status: Former Smoker     Quit date: 9/1/2009     Smokeless tobacco: Never Used     Alcohol use Yes       ROS:  Review of systems negative except as stated above.    OBJECTIVE:  /88  Pulse 85  Temp 97.5  F (36.4  C) (Oral)  Resp 16  Wt 157 lb (71.2 kg)  SpO2 96%  Breastfeeding? No  BMI 26.13 kg/m2  GENERAL APPEARANCE: alert and no distress  EYES: EOMI,  PERRL, conjunctiva clear  HENT: ear canals and TM's normal.  Nose and mouth without ulcers, erythema or lesions  NECK: supple, nontender, no lymphadenopathy  RESP: wheezes throughout  CV: regular rates and rhythm, normal S1 S2, no murmur noted  NEURO: Normal strength and tone, sensory exam grossly normal,  normal speech and mentation  SKIN: no suspicious lesions or rashes    ASSESSMENT:  (J98.8) Wheezing-associated respiratory infection  (primary encounter diagnosis)    Plan:   azithromycin (ZITHROMAX) 250 MG tablet,          predniSONE (DELTASONE) 20 MG tablet, albuterol         (PROAIR HFA/PROVENTIL HFA/VENTOLIN HFA) 108 (90        Base) MCG/ACT inhaler    Emergent symptoms as reviewed to ER  Not improving call or rtc    TONY Maldonado CNP

## 2018-11-12 NOTE — MR AVS SNAPSHOT
After Visit Summary   11/12/2018    Yanelis Wolff    MRN: [id number]           Patient Information     Date Of Birth          1955        Visit Information        Provider Department      11/12/2018 2:40 PM Kamla Villagomez APRN CNP [facility]        Today's Diagnoses     Wheezing-associated respiratory infection    -  1       Follow-ups after your visit        Who to contact     If you have questions or need follow up information about today's clinic visit or your schedule please contact [facility] directly at [phone].  Normal or non-critical lab and imaging results will be communicated to you by MyChart, letter or phone within 4 business days after the clinic has received the results. If you do not hear from us within 7 days, please contact the clinic through MyChart or phone. If you have a critical or abnormal lab result, we will notify you by phone as soon as possible.  Submit refill requests through [employer] or call your pharmacy and they will forward the refill request to us. Please allow 3 business days for your refill to be completed.          Additional Information About Your Visit        Care EveryWhere ID     This is your Care EveryWhere ID. This could be used by other organizations to access your [facility] medical records  [id number]        Your Vitals Were     Pulse Temperature Respirations Pulse Oximetry Breastfeeding? BMI (Body Mass Index)    85 97.5  F (36.4  C) (Oral) 16 96% No 26.13 kg/m2       Blood Pressure from Last 3 Encounters:   11/12/18 139/88   09/04/18 157/89   04/24/18 144/72    Weight from Last 3 Encounters:   11/12/18 157 lb (71.2 kg)   09/04/18 160 lb (72.6 kg)   04/24/18 158 lb 6.4 oz (71.8 kg)              Today, you had the following     No orders found for display         Today's Medication Changes          These changes are accurate as of 11/12/18  2:52 PM.  If you have any questions, ask your nurse or doctor.                Start taking these medicines.        Dose/Directions    azithromycin 250 MG tablet   Commonly known as:  ZITHROMAX   Used for:  Wheezing-associated respiratory infection   Started by:  Kamla Villagomez APRN CNP        Two tablets first day, then one tablet daily for four days.   Quantity:  6 tablet   Refills:  0       predniSONE 20 MG tablet   Commonly known as:  DELTASONE   Used for:  Wheezing-associated respiratory infection   Started by:  Kamla Villagomez APRN CNP        Dose:  20 mg   Take 1 tablet (20 mg) by mouth 2 times daily for 7 days   Quantity:  14 tablet   Refills:  0         These medicines have changed or have updated prescriptions.        Dose/Directions    * albuterol 108 (90 Base) MCG/ACT inhaler   Commonly known as:  PROAIR HFA/PROVENTIL HFA/VENTOLIN HFA   This may have changed:  Another medication with the same name was added. Make sure you understand how and when to take each.   Used for:  Wheezing-associated respiratory infection (WARI)   Changed by:  Kamla Villagomez APRN CNP        Dose:  2 puff   Inhale 2 puffs into the lungs every 6 hours as needed for shortness of breath / dyspnea or wheezing   Quantity:  1 Inhaler   Refills:  1       * albuterol 108 (90 Base) MCG/ACT inhaler   Commonly known as:  PROAIR HFA/PROVENTIL HFA/VENTOLIN HFA   This may have changed:  You were already taking a medication with the same name, and this prescription was added. Make sure you understand how and when to take each.   Used for:  Wheezing-associated respiratory infection   Changed by:  Kamla Villagomez APRN CNP        Dose:  2 puff   Inhale 2 puffs into the lungs every 6 hours as needed for shortness of breath / dyspnea or wheezing   Quantity:  1 Inhaler   Refills:  0       * Notice:  This list has 2 medication(s) that are the same as other medications prescribed for you. Read the directions carefully, and ask your doctor or other care provider to review them with you.         Where to get your  medicines      These medications were sent to [facility] [address] [facility]  [address] [facility], [address], [facility] [address]     Phone:  [phone]     albuterol 108 (90 Base) MCG/ACT inhaler    azithromycin 250 MG tablet    predniSONE 20 MG tablet                Primary Care Provider Office Phone # Fax #    [facility] [phone] [address] [facility] [address]        Equal Access to Services     DORIAN ROSS : Hadii aad ku hadasho Soomaali, waaxda luqadaha, qaybta kaalmada adeegyada, waxay idiin hayaan adeeg kharaantonio barrera . So [facility] [phone].    ATENCIÓN: Si habla español, tiene a luis disposición servicios gratuitos de asistencia lingüística. [facility] [phone].    We comply with applicable federal civil rights laws and [place] laws. We do not discriminate on the basis of race, color, national origin, age, disability, sex, sexual orientation, or gender identity.            Thank you!     Thank you for choosing [facility]  for your care. Our goal is always to provide you with excellent care. Hearing back from our patients is one way we can continue to improve our services. Please take a few minutes to complete the written survey that you may receive in the mail after your visit with us. Thank you!             Your Updated Medication List - Protect others around you: Learn how to safely use, store and throw away your medicines at www.disposemymeds.org.          This list is accurate as of 11/12/18  2:52 PM.  Always use your most recent med list.                   Brand Name Dispense Instructions for use Diagnosis    * albuterol 108 (90 Base) MCG/ACT inhaler    PROAIR HFA/PROVENTIL HFA/VENTOLIN HFA    1 Inhaler    Inhale 2 puffs into the lungs every 6 hours as needed for shortness of breath / dyspnea or wheezing    Wheezing-associated respiratory infection (WARI)       * albuterol 108 (90 Base) MCG/ACT inhaler     PROAIR HFA/PROVENTIL HFA/VENTOLIN HFA    1 Inhaler    Inhale 2 puffs into the lungs every 6 hours as needed for shortness of breath / dyspnea or wheezing    Wheezing-associated respiratory infection       azithromycin 250 MG tablet    ZITHROMAX    6 tablet    Two tablets first day, then one tablet daily for four days.    Wheezing-associated respiratory infection       predniSONE 20 MG tablet    DELTASONE    14 tablet    Take 1 tablet (20 mg) by mouth 2 times daily for 7 days    Wheezing-associated respiratory infection       * Notice:  This list has 2 medication(s) that are the same as other medications prescribed for you. Read the directions carefully, and ask your doctor or other care provider to review them with you.

## 2019-01-14 ENCOUNTER — OFFICE VISIT (OUTPATIENT)
Dept: FAMILY MEDICINE | Facility: CLINIC | Age: 64
End: 2019-01-14
Payer: COMMERCIAL

## 2019-01-14 VITALS
RESPIRATION RATE: 18 BRPM | HEART RATE: 78 BPM | WEIGHT: 159 LBS | TEMPERATURE: 98.8 F | SYSTOLIC BLOOD PRESSURE: 138 MMHG | HEIGHT: 65 IN | DIASTOLIC BLOOD PRESSURE: 82 MMHG | OXYGEN SATURATION: 97 % | BODY MASS INDEX: 26.49 KG/M2

## 2019-01-14 DIAGNOSIS — J22 LOWER RESPIRATORY TRACT INFECTION: ICD-10-CM

## 2019-01-14 DIAGNOSIS — Z12.31 VISIT FOR SCREENING MAMMOGRAM: ICD-10-CM

## 2019-01-14 DIAGNOSIS — R06.2 WHEEZING: Primary | ICD-10-CM

## 2019-01-14 DIAGNOSIS — Z71.89 ADVANCED DIRECTIVES, COUNSELING/DISCUSSION: ICD-10-CM

## 2019-01-14 DIAGNOSIS — Z12.11 SCREEN FOR COLON CANCER: ICD-10-CM

## 2019-01-14 PROCEDURE — 99214 OFFICE O/P EST MOD 30 MIN: CPT | Performed by: PHYSICIAN ASSISTANT

## 2019-01-14 RX ORDER — ALBUTEROL SULFATE 90 UG/1
2 AEROSOL, METERED RESPIRATORY (INHALATION) EVERY 6 HOURS
Qty: 1 INHALER | Refills: 5 | Status: SHIPPED | OUTPATIENT
Start: 2019-01-14 | End: 2020-03-25

## 2019-01-14 RX ORDER — PREDNISONE 20 MG/1
20 TABLET ORAL DAILY
Qty: 10 TABLET | Refills: 0 | Status: SHIPPED | OUTPATIENT
Start: 2019-01-14 | End: 2019-05-06

## 2019-01-14 RX ORDER — FLUTICASONE PROPIONATE 220 UG/1
1 AEROSOL, METERED RESPIRATORY (INHALATION) 2 TIMES DAILY
Qty: 1 INHALER | Refills: 3 | Status: SHIPPED | OUTPATIENT
Start: 2019-01-14 | End: 2020-03-25

## 2019-01-14 ASSESSMENT — MIFFLIN-ST. JEOR: SCORE: 1267.1

## 2019-01-14 NOTE — PROGRESS NOTES
SUBJECTIVE:                                                    Yanelis Wolff is a 63 year old female who presents to clinic today for the following health issues:    ENT Symptoms             Symptoms: cc Present Absent Comment   Fever/Chills   x    Fatigue   x    Muscle Aches   x    Eye Irritation   x    Sneezing   x    Nasal Joaquin/Drg   x    Sinus Pressure/Pain   x    Loss of smell   x    Dental pain   x    Sore Throat   x    Swollen Glands   x    Ear Pain/Fullness   x    Cough  x     Wheeze  x     Chest Pain   x    Shortness of breath  x     Rash   x    Other   x      Symptom duration:  x 3 weeks   Symptom severity:   moderate   Treatments tried:  OTC and Steroid med?   Contacts:  none     Patient given azithromycin and prednisone and albuterol in november for URI.  Feels better on medication but then symptoms come back.   Had negative cxr 4-2018.   Former smoker-quit 2 years ago.  Smoked for about 30 years about 1 pack per day.   Inhaler helps her feel better, would like refill.   No seasonal allergies.   No h/o asthma. Daughter had asthma.   Productive sputum production--clearish yellowish color.   She has shortness of breath that is greatly improved with albuterol. Could have COPD. Patient is not very compliant with seeing the same provider. Would suggest she get a PCP.     Declines lung cancer screening.  Would qualify for this.         Oxygen is 97 percent.     Colon cancer screening is due.    MAMMO is due per pt, declined to [facility] at this time.    PAP was completed 2 yrs ago [facility] normal results, will abstract data into charts.        Problem list and histories reviewed & adjusted, as indicated.  Additional history: as documented    Patient Active Problem List   Diagnosis     Edema     Leg pain     Microscopic hematuria     CARDIOVASCULAR SCREENING; LDL GOAL LESS THAN 160     Advanced directives, counseling/discussion     History reviewed. No pertinent surgical history.    Social History  "    Tobacco Use     Smoking status: Former Smoker     Last attempt to quit: 2009     Years since quittin.3     Smokeless tobacco: Never Used   Substance Use Topics     Alcohol use: Yes     Family History   Problem Relation Age of Onset     Asthma Daughter      Alzheimer Disease Mother          Current Outpatient Medications   Medication Sig Dispense Refill     albuterol (PROAIR HFA/PROVENTIL HFA/VENTOLIN HFA) 108 (90 Base) MCG/ACT inhaler Inhale 2 puffs into the lungs every 6 hours as needed for shortness of breath / dyspnea or wheezing 1 Inhaler 1     Allergies   Allergen Reactions     Codeine        ROS:  Constitutional, HEENT, cardiovascular, pulmonary, GI, , musculoskeletal, neuro, skin, endocrine and psych systems are negative, except as otherwise noted.    OBJECTIVE:     /88   Pulse 78   Temp 98.8  F (37.1  C) (Tympanic)   Resp 18   Ht 1.651 m (5' 5\")   Wt 72.1 kg (159 lb)   SpO2 97%   BMI 26.46 kg/m    Body mass index is 26.46 kg/m .  GENERAL:  No acute distress.  Interacts appropriately.  Breathing without difficulty.  Alert.  HEENT:  Tympanic membranes intact without effusion or erythema.  Oral mucosa moist. Posterior pharynx has no erythema.  Posterior pharynx has no exudate. no edema.  NECK:  Soft and supple.  without tenderness.  no lymphadenopathy.  Normal range of motion.    CARDIAC:   Regular rate and rhythm.  No murmurs, rubs, or gallops.   PULMONARY:diffuse expiratory wheezing throughout. No crackles or rhonchi.  No use of accessory muscles.    PSYCH: Normal affect.  SKIN: No rashes.        ASSESSMENT/PLAN:     ASSESSMENT / PLAN:  (R06.2) Wheezing  (primary encounter diagnosis)  Comment:   Plan: fluticasone (FLOVENT HFA) 220 MCG/ACT inhaler,         albuterol (PROAIR HFA/PROVENTIL HFA/VENTOLIN         HFA) 108 (90 Base) MCG/ACT inhaler            (Z12.11) Screen for colon cancer  Comment:   Plan: GASTROENTEROLOGY ADULT REF PROCEDURE ONLY [facility] [phone]; " [facility]         Surgery            (Z12.31) Visit for screening mammogram  Comment:   Plan: MA SCREENING DIGITAL BILAT - Future  (s+30)            (Z71.89) Advanced directives, counseling/discussion  Comment:   Plan:        Plan: predniSONE (DELTASONE) 20 MG tablet        Not convinced she needs antibiotic but we could add azithromycin again and get cxr in future if needed    COPD versus post-viral inflammatory bronchitis  Consider spriometry in future    Patient Instructions   Call in if symptoms are not improving over the next 3 days I will call in antibiotic  Start new inhaler daily, this you will stay on longterm  Continue albuterol as needed  Take prednisone with food In the morning                Abi Waggoner PA-C  [facility]

## 2019-01-14 NOTE — PATIENT INSTRUCTIONS
Call in if symptoms are not improving over the next 3 days I will call in antibiotic  Start new inhaler daily, this you will stay on longterm  Continue albuterol as needed  Take prednisone with food In the morning

## 2019-05-06 ENCOUNTER — OFFICE VISIT (OUTPATIENT)
Dept: FAMILY MEDICINE | Facility: CLINIC | Age: 64
End: 2019-05-06
Payer: COMMERCIAL

## 2019-05-06 VITALS
DIASTOLIC BLOOD PRESSURE: 78 MMHG | TEMPERATURE: 98.2 F | RESPIRATION RATE: 20 BRPM | HEART RATE: 81 BPM | BODY MASS INDEX: 27.02 KG/M2 | OXYGEN SATURATION: 96 % | WEIGHT: 162.4 LBS | SYSTOLIC BLOOD PRESSURE: 164 MMHG

## 2019-05-06 DIAGNOSIS — Z01.818 PREOP GENERAL PHYSICAL EXAM: Primary | ICD-10-CM

## 2019-05-06 DIAGNOSIS — R03.0 ELEVATED BP WITHOUT DIAGNOSIS OF HYPERTENSION: ICD-10-CM

## 2019-05-06 DIAGNOSIS — H25.012 CORTICAL AGE-RELATED CATARACT OF LEFT EYE: ICD-10-CM

## 2019-05-06 PROCEDURE — 99214 OFFICE O/P EST MOD 30 MIN: CPT | Performed by: FAMILY MEDICINE

## 2019-05-06 NOTE — NURSING NOTE
"Chief Complaint   Patient presents with     Pre-Op Exam       Initial /80   Pulse 81   Temp 98.2  F (36.8  C) (Oral)   Resp 20   Wt 73.7 kg (162 lb 6.4 oz)   SpO2 96%   BMI 27.02 kg/m   Estimated body mass index is 27.02 kg/m  as calculated from the following:    Height as of 1/14/19: 1.651 m (5' 5\").    Weight as of this encounter: 73.7 kg (162 lb 6.4 oz).  Medication Reconciliation: complete  Duc Barragan CMA    "

## 2019-05-06 NOTE — PROGRESS NOTES
[facility]  [address] [address]  [phone]  Dept: [phone]    PRE-OP EVALUATION:  Today's date: 2019    Yanelis Wolff (: 1955) presents for pre-operative evaluation assessment as requested by Dr. Blackwell.  She requires evaluation and anesthesia risk assessment prior to undergoing surgery/procedure for treatment of Cataracts .    Proposed Surgery/ Procedure: Cataracts  Date of Surgery/ Procedure: 19  Time of Surgery/ Procedure: [facility]  Hospital/Surgical Facility: [facility]   Fax number for surgical facility:   Primary Physician: [facility]  Type of Anesthesia Anticipated: to be determined    Patient has a Health Care Directive or Living Will:  NO    1. NO - Do you have a history of heart attack, stroke, stent, bypass or surgery on an artery in the head, neck, heart or legs?  2. NO - Do you ever have any pain or discomfort in your chest?  3. NO - Do you have a history of  Heart Failure?  4. YES - ARE YOUR TROUBLED BY SHORTNESS OF BREATH WHEN WALKING ON THE LEVEL, UP A SLIGHT HILL OR AT NIGHT? Unclear diagnosis.  Using flovent daily and albuterol about once daily in the AM.  5. NO - Do you currently have a cold, bronchitis or other respiratory infection?  6. YES - DO YOU HAVE A COUGH, SHORTNESS OF BREATH OR WHEEZING? See # 4  7. NO - Do you sometimes get pains in the calves of your legs when you walk?  8. NO - Do you or anyone in your family have previous history of blood clots?  9. NO - Do you or does anyone in your family have a serious bleeding problem such as prolonged bleeding following surgeries or cuts?  10. NO - Have you ever had problems with anemia or been told to take iron pills?  11. NO - Have you had any abnormal blood loss such as black, tarry or bloody stools, or abnormal vaginal bleeding?  12. NO - Have you ever had a blood transfusion?  13. NO - Have you or any of your relatives ever had problems with  anesthesia?  14. NO - Do you have sleep apnea, excessive snoring or daytime drowsiness?  15. NO - Do you have any prosthetic heart valves?  16. NO - Do you have prosthetic joints?  17. NO - Is there any chance that you may be pregnant?      HPI:     HPI related to upcoming procedure: left eye cataract, impacting vision and requiring removal      ASTHMA - Patient has a longstanding history of moderate-severe Asthma . Patient has been doing well overall noting AM shortness of breath resolves with albuterol and continues on medication regimen consisting of flovent and albuterol without adverse reactions or side effects.                                                                                                                                               .  HYPERTENSION - Patient haswhitecoat HTN , currently denies any symptoms referable to elevated blood pressure. Specifically denies chest pain, palpitations, dyspnea, orthopnea, PND or peripheral edema. Blood pressure readings have not been in normal range. No current medication. Patient denies any side effects of medication.                                                                                                                                                                                          .    MEDICAL HISTORY:     Patient Active Problem List    Diagnosis Date Noted     Elevated BP without diagnosis of hypertension 05/06/2019     Priority: Medium     Advanced directives, counseling/discussion 01/14/2019     Priority: Medium     Pt was given info on ADV. Sobeida Vidalse MA       CARDIOVASCULAR SCREENING; LDL GOAL LESS THAN 160 10/31/2010     Priority: Medium     Microscopic hematuria      Priority: Medium     Edema 09/01/2010     Priority: Medium     Leg pain 09/01/2010     Priority: Medium      Past Medical History:   Diagnosis Date     Edema      Microscopic hematuria      MVP (mitral valve prolapse)     negative stress test and echo in 2009      Smoker     quit in      History reviewed. No pertinent surgical history.  Current Outpatient Medications   Medication Sig Dispense Refill     albuterol (PROAIR HFA/PROVENTIL HFA/VENTOLIN HFA) 108 (90 Base) MCG/ACT inhaler Inhale 2 puffs into the lungs every 6 hours 1 Inhaler 5     fluticasone (FLOVENT HFA) 220 MCG/ACT inhaler Inhale 1 puff into the lungs 2 times daily 1 Inhaler 3     OTC products: None, except as noted above    Allergies   Allergen Reactions     Codeine       Latex Allergy: NO    Social History     Tobacco Use     Smoking status: Former Smoker     Last attempt to quit: 2009     Years since quittin.6     Smokeless tobacco: Never Used   Substance Use Topics     Alcohol use: Yes     History   Drug Use No       REVIEW OF SYSTEMS:   CONSTITUTIONAL: NEGATIVE for fever, chills, change in weight  ENT/MOUTH: NEGATIVE for ear, mouth and throat problems  RESP: NEGATIVE for significant cough or SOB  CV: NEGATIVE for chest pain, palpitations or peripheral edema    EXAM:   /78   Pulse 81   Temp 98.2  F (36.8  C) (Oral)   Resp 20   Wt 73.7 kg (162 lb 6.4 oz)   SpO2 96%   BMI 27.02 kg/m    GENERAL APPEARANCE: healthy, alert and no distress  HENT: ear canals and TM's normal and nose and mouth without ulcers or lesions  RESP: lungs clear to auscultation - no rales, rhonchi or wheezes  CV: regular rates and rhythm, normal S1 S2, no S3 or S4 and grade 3/6 systolic murmur heard best over the RUSB, radiation to right carotid  ABDOMEN: soft, nontender, no HSM or masses and bowel sounds normal  NEURO: Normal strength and tone, sensory exam grossly normal, mentation intact and speech normal    DIAGNOSTICS:   No labs or EKG required for low risk surgery (cataract, skin procedure, breast biopsy, etc)    No results for input(s): HGB, PLT, INR, NA, POTASSIUM, CR, A1C in the last 73189 hours.     IMPRESSION:   Reason for surgery/procedure:  left eye cataract, impacting vision and requiring  removal      The proposed surgical procedure is considered LOW risk.    REVISED CARDIAC RISK INDEX  The patient has the following serious cardiovascular risks for perioperative complications such as (MI, PE, VFib and 3  AV Block):  No serious cardiac risks  INTERPRETATION: 0 risks: Class I (very low risk - 0.4% complication rate)    The patient has the following additional risks for perioperative complications:  No identified additional risks      ICD-10-CM    1. Preop general physical exam Z01.818    2. Cortical age-related cataract of left eye H25.012    3. Elevated BP without diagnosis of hypertension R03.0        RECOMMENDATIONS:       --Patient is to take all scheduled medications on the day of surgery.    APPROVAL GIVEN to proceed with proposed procedure, without further diagnostic evaluation       Signed Electronically by: Mary Hunt MD    Copy of this evaluation report is provided to requesting physician.    [facility] Preop Guidelines    Revised Cardiac Risk Index

## 2019-05-07 ENCOUNTER — TELEPHONE (OUTPATIENT)
Dept: FAMILY MEDICINE | Facility: CLINIC | Age: 64
End: 2019-05-07

## 2020-03-24 ENCOUNTER — TELEPHONE (OUTPATIENT)
Dept: FAMILY MEDICINE | Facility: CLINIC | Age: 65
End: 2020-03-24

## 2020-03-24 DIAGNOSIS — R06.2 WHEEZING: ICD-10-CM

## 2020-03-24 RX ORDER — FLUTICASONE PROPIONATE 220 UG/1
1 AEROSOL, METERED RESPIRATORY (INHALATION) 2 TIMES DAILY
Qty: 1 INHALER | Refills: 3 | Status: CANCELLED | OUTPATIENT
Start: 2020-03-24

## 2020-03-24 RX ORDER — ALBUTEROL SULFATE 90 UG/1
2 AEROSOL, METERED RESPIRATORY (INHALATION) EVERY 6 HOURS
Qty: 1 INHALER | Refills: 5 | Status: CANCELLED | OUTPATIENT
Start: 2020-03-24

## 2020-03-24 NOTE — TELEPHONE ENCOUNTER
Patient states she would like a refill on her inhaler. She was advised to call her pharmacy but stated she wanted to talk with Dr Hunt. Please advise

## 2020-03-24 NOTE — TELEPHONE ENCOUNTER
Last office visit for 5/6/20  Patient is requesting a refill of Albuterol inhaler and Flovent.  Medications last prescribed for wheezing.  Patient states had a lower respiratory infection.   Patient states did better with inhalers.  Problem list does not reflect asthma/copd.  Please advise on refill.  Patient is due for an office visit.  Please advise, would you like patient to do a Virtual Visit for follow up?  If so, which one?   Diana Calle RN

## 2020-03-25 ENCOUNTER — VIRTUAL VISIT (OUTPATIENT)
Dept: FAMILY MEDICINE | Facility: CLINIC | Age: 65
End: 2020-03-25
Payer: COMMERCIAL

## 2020-03-25 DIAGNOSIS — R06.2 WHEEZING: ICD-10-CM

## 2020-03-25 PROCEDURE — 99213 OFFICE O/P EST LOW 20 MIN: CPT | Mod: TEL | Performed by: FAMILY MEDICINE

## 2020-03-25 RX ORDER — FLUTICASONE PROPIONATE 220 UG/1
1 AEROSOL, METERED RESPIRATORY (INHALATION) 2 TIMES DAILY
Qty: 3 INHALER | Refills: 1 | Status: SHIPPED | OUTPATIENT
Start: 2020-03-25

## 2020-03-25 RX ORDER — ALBUTEROL SULFATE 90 UG/1
2 AEROSOL, METERED RESPIRATORY (INHALATION) EVERY 6 HOURS
Qty: 2 INHALER | Refills: 5 | Status: SHIPPED | OUTPATIENT
Start: 2020-03-25

## 2020-03-25 NOTE — PROGRESS NOTES
"Yanelis Wolff is a 65 year old female who is being evaluated via a billable telephone visit.      The patient has been notified of following:     \"This telephone visit will be conducted via a call between you and your physician/provider. We have found that certain health care needs can be provided without the need for a physical exam.  This service lets us provide the care you need with a short phone conversation.  If a prescription is necessary we can send it directly to your pharmacy.  If lab work is needed we can place an order for that and you can then stop by our lab to have the test done at a later time.    If during the course of the call the physician/provider feels a telephone visit is not appropriate, you will not be charged for this service.\"     Duc Barragan CMA    This visit was completed via telpehone due to the restrictions of the COVID-19 pandemic.  All issues as below were discussed and addressed but no physical exam was performed due to the limitations of an audio-only modality.  If it was felt that the patient should be evaluated in clinic or in an emergency room setting, then they were directed there.  Patient identification was verified at the start of the visit, including the patient's telephone number and physical location in case of emergency.  Patient verbally consented to visit and demonstrated an understanding of the limitations of the is virtual visit.       Yanelis Wolff complains of    Chief Complaint   Patient presents with     Recheck Medication       I have reviewed and updated the patient's Past Medical History, Social History, Family History and Medication List.    ALLERGIES  Codeine    Seen for wheezing 1/14/2019.  Patient reports SOB with physical activity such as stairs in the home.   Cough present since starting on inhailer.   Patient reports that use of albuterol inhaler helps with symptoms.  Using albuterol daily, most evenings to help with shortness of " breath. Will also use if needed at work to help with shortness of breath when busy - gets excellent relief.     Has not been using flovent regularly.      Denies cough/congestion/fever  Due for other HM issues as well          Assessment/Plan:  (R06.2) Wheezing  Comment: likely mild persistent astham  Plan: fluticasone (FLOVENT HFA) 220 MCG/ACT inhaler,         albuterol (PROAIR HFA/PROVENTIL HFA/VENTOLIN         HFA) 108 (90 Base) MCG/ACT inhaler         discussed controller vs rescue medication  Recommend start flovent 220 2 puffs bid, goal to reduce albuterol use  F/u 6 months for wellness exam   Warned to rinse mouth after flovent use.       10:19 AM  10:35 AM    Phone call duration:  6 minutes    Mary Hunt MD

## 2021-02-11 ENCOUNTER — TELEPHONE (OUTPATIENT)
Dept: FAMILY MEDICINE | Facility: CLINIC | Age: 66
End: 2021-02-11

## 2021-02-11 NOTE — TELEPHONE ENCOUNTER
Panel Management Review      Patient has the following on her problem list: None      Composite cancer screening  Chart review shows that this patient is due/due soon for the following Mammogram and Colonoscopy  Summary:    Patient is due/failing the following:   Wellness visit , COLONOSCOPY and Mammogram     Action needed:   Patient needs office visit for Annual wellness visit, mammogram and colonoscopy .    Type of outreach:    Sent letter.    Questions for provider review:    None                                                                                                                                    Duc Barragan CMA       Chart routed to closed  .

## 2021-02-11 NOTE — LETTER
February 11, 2021      Yanelis Wolff  [address]   [facility] [address]        Our records indicate that you have not scheduled for a(n)mammogram, colonoscopy and Annual wellness exam which was recommended by your health care team. Monitoring and managing your preventative and chronic health conditions are very important to us.     If you have received your health care elsewhere, please provide us with that information so it can be documented in your chart.    Please call [phone] or message us through your [employer] account to schedule an appointment or provide information for your chart.     I look forward to seeing you and working with you on your health care needs.       *If you have already scheduled an appointment, please disregard this reminder            Sincerely,        [facility]/jj

## 2024-06-17 PROBLEM — Z71.89 ADVANCED DIRECTIVES, COUNSELING/DISCUSSION: Status: RESOLVED | Noted: 2019-01-14 | Resolved: 2024-06-17
